# Patient Record
Sex: FEMALE | Race: WHITE | NOT HISPANIC OR LATINO
[De-identification: names, ages, dates, MRNs, and addresses within clinical notes are randomized per-mention and may not be internally consistent; named-entity substitution may affect disease eponyms.]

---

## 2020-10-06 ENCOUNTER — TRANSCRIPTION ENCOUNTER (OUTPATIENT)
Age: 30
End: 2020-10-06

## 2022-07-22 PROBLEM — Z00.00 ENCOUNTER FOR PREVENTIVE HEALTH EXAMINATION: Status: ACTIVE | Noted: 2022-07-22

## 2022-07-28 ENCOUNTER — APPOINTMENT (OUTPATIENT)
Dept: NEUROSURGERY | Facility: CLINIC | Age: 32
End: 2022-07-28

## 2022-07-28 VITALS
TEMPERATURE: 97 F | OXYGEN SATURATION: 98 % | BODY MASS INDEX: 27.48 KG/M2 | RESPIRATION RATE: 18 BRPM | HEART RATE: 80 BPM | SYSTOLIC BLOOD PRESSURE: 112 MMHG | DIASTOLIC BLOOD PRESSURE: 77 MMHG | HEIGHT: 66 IN | WEIGHT: 171 LBS

## 2022-07-28 DIAGNOSIS — M51.26 OTHER INTERVERTEBRAL DISC DISPLACEMENT, LUMBAR REGION: ICD-10-CM

## 2022-07-28 PROCEDURE — 99204 OFFICE O/P NEW MOD 45 MIN: CPT

## 2022-08-02 NOTE — HISTORY OF PRESENT ILLNESS
[> 3 months] : more  than 3 months [de-identified] : 31 yo right handed female has low back pain on the left for the past 7 years  had lumbar mri 2018 done and compared this past May 2022 developed increase left buttock pain radiating down left leg has  increased over the past few months. Seeing Dr. Garcia at [place] and underwent TEGAN X2 last 2 weeks ago with relief Pt is also underwent PT without relief .  Pt had MRI Lumbar spine and is here to discuss findings\par Pt is strong throught but while lifting Right leg she has increased l buttock and lle pain\par \par PMH/PSH denies\par \par Medications Neurontin ,Mexlocam\par \par NKDA

## 2022-08-02 NOTE — PHYSICAL EXAM
[General Appearance - Alert] : alert [General Appearance - In No Acute Distress] : in no acute distress [Oriented To Time, Place, And Person] : oriented to person, place, and time [Impaired Insight] : insight and judgment were intact [Affect] : the affect was normal [Person] : oriented to person [Place] : oriented to place [Time] : oriented to time [Short Term Intact] : short term memory intact [Remote Intact] : remote memory intact [Span Intact] : the attention span was normal [Concentration Intact] : normal concentrating ability [Fluency] : fluency intact [Comprehension] : comprehension intact [Current Events] : adequate knowledge of current events [Past History] : adequate knowledge of personal past history [Vocabulary] : adequate range of vocabulary [Cranial Nerves Optic (II)] : visual acuity intact bilaterally,  pupils equal round and reactive to light [Cranial Nerves Oculomotor (III)] : extraocular motion intact [Cranial Nerves Trigeminal (V)] : facial sensation intact symmetrically [Cranial Nerves Facial (VII)] : face symmetrical [Cranial Nerves Vestibulocochlear (VIII)] : hearing was intact bilaterally [Cranial Nerves Glossopharyngeal (IX)] : tongue and palate midline [Cranial Nerves Accessory (XI - Cranial And Spinal)] : head turning and shoulder shrug symmetric [Cranial Nerves Hypoglossal (XII)] : there was no tongue deviation with protrusion [Motor Strength] : muscle strength was normal in all four extremities [No Muscle Atrophy] : normal bulk in all four extremities [Sensation Tactile Decrease] : light touch was intact [Balance] : balance was intact [2+] : Patella left 2+ [No Visual Abnormalities] : no visible abnormailities [Intact] : all sensory within normal limits bilaterally [Sclera] : the sclera and conjunctiva were normal [PERRL With Normal Accommodation] : pupils were equal in size, round, reactive to light, with normal accommodation [Extraocular Movements] : extraocular movements were intact [Neck Appearance] : the appearance of the neck was normal [Neck Cervical Mass (___cm)] : no neck mass was observed [Jugular Venous Distention Increased] : there was no jugular-venous distention [Thyroid Diffuse Enlargement] : the thyroid was not enlarged [Thyroid Nodule] : there were no palpable thyroid nodules [Auscultation Breath Sounds / Voice Sounds] : lungs were clear to auscultation bilaterally [Heart Rate And Rhythm] : heart rate was normal and rhythm regular [Heart Sounds] : normal S1 and S2 [Heart Sounds Gallop] : no gallops [Murmurs] : no murmurs [Heart Sounds Pericardial Friction Rub] : no pericardial rub [No CVA Tenderness] : no ~M costovertebral angle tenderness [No Spinal Tenderness] : no spinal tenderness [Abnormal Walk] : normal gait [Nail Clubbing] : no clubbing  or cyanosis of the fingernails [Musculoskeletal - Swelling] : no joint swelling seen [Motor Tone] : muscle strength and tone were normal [Skin Color & Pigmentation] : normal skin color and pigmentation [Skin Turgor] : normal skin turgor [] : no rash [Past-pointing] : there was no past-pointing [Tremor] : no tremor present [FreeTextEntry9] : L knee hyperreflexic

## 2022-08-02 NOTE — DATA REVIEWED
[de-identified] : progression of lumbar degeneratove changes most pronounce L4-5 has a marked interval increase in sizeof a left central disc extrusion which now contributes to sever spinal canal stenosis, left lateral recess effacement and right lateral recess narrowing  Impingement of several nerve roots at this level  most severly affecting the left L 5 nerve root

## 2022-08-02 NOTE — ASSESSMENT
[FreeTextEntry1] : PLAN\par \par Reviewed images and assess pt\par Discussed Risks and benefits of performing L4-5 laminectomy\par Pt will call with a date for surgery \par Pt verbalized understanding\par \par \par \par I, Dr. Mccollum, personally performed the evaluation and management (E/M) services for this new patient. That E/M includes conducting the initial examination, assessing all conditions, and establishing the plan of care. Today, my ACP, Mary Ogden, was here to observe my evaluation and management services for this patient to be followed going forward.\par \par

## 2022-08-02 NOTE — ADDENDUM
[FreeTextEntry1] : PATIENT:	Lupe Castro 				\par \par \par Thursday, July 28, 2022\par \par I saw Lupe in the office today. She is a 32-year-old female with a significant past medical history who presents with a large, centrally herniated lumbar disc, and severe left leg pain. Lupe’s current history dates back to 2018 when she began to develop left leg symptoms, and she treated herself conservatively despite a sizeable, left sided L4-5 disc fragment. She underwent serial injections with significant improvement, however she has maintained significant left leg pain throughout. She presents now in the office with intractable left leg pain that is severe, after having acute exacerbation in March of this year.\par \par Lupe reports that her pain has been excruciating at times and had a series of epidural injections, again with some improvement. Today she presents with persistent pain, better than it was, but continues to affect her quality of life. \par \par Her past medical history is notable for the absence of significant surgical or medical histories. She takes meloxicam and gabapentin for pain, and has had no other major surgery. Neurologically she is intact. She is able to heel and toe walk without difficulty, and she has a straight leg test and a contralateral straight leg test as well. Imaging shows a large, L4-5 disc herniation that appears significantly larger than her prior, and in fact a disc looks to be new rather than recurrent.\par \par I had a long discussion with Lupe about the best way to proceed. We did discuss alternatives to open surgical resection of her disc, and I do think this would give her the best long-term outcome. We reviewed the potentials for a fusion as well, and I would like to recommend bilateral laminotomies in all likelihood with the intent to remove this fragment as well as possible. I certainly do not see a need for fusion at this time, and told her so. There is also a significant amount of modic changes that she is maintaining in her DIC, and it is possible that her back pain, should she develop it, could be improved with an Intracept procedure.\par \par \par \par \par \par \par \par \par Tito Mccollum MD\par \par [facility] #[id number]\par \par \par \par

## 2022-09-12 ENCOUNTER — OUTPATIENT (OUTPATIENT)
Dept: OUTPATIENT SERVICES | Facility: HOSPITAL | Age: 32
LOS: 1 days | End: 2022-09-12
Payer: COMMERCIAL

## 2022-09-12 ENCOUNTER — NON-APPOINTMENT (OUTPATIENT)
Age: 32
End: 2022-09-12

## 2022-09-12 ENCOUNTER — APPOINTMENT (OUTPATIENT)
Dept: NEUROSURGERY | Facility: CLINIC | Age: 32
End: 2022-09-12

## 2022-09-12 VITALS
OXYGEN SATURATION: 98 % | HEART RATE: 96 BPM | BODY MASS INDEX: 27.48 KG/M2 | HEIGHT: 66 IN | SYSTOLIC BLOOD PRESSURE: 126 MMHG | WEIGHT: 171 LBS | DIASTOLIC BLOOD PRESSURE: 78 MMHG | TEMPERATURE: 98.2 F

## 2022-09-12 DIAGNOSIS — M54.50 LOW BACK PAIN, UNSPECIFIED: ICD-10-CM

## 2022-09-12 DIAGNOSIS — Z01.818 ENCOUNTER FOR OTHER PREPROCEDURAL EXAMINATION: ICD-10-CM

## 2022-09-12 LAB
ANION GAP SERPL CALC-SCNC: 13 MMOL/L
BUN SERPL-MCNC: 6 MG/DL
CALCIUM SERPL-MCNC: 10.5 MG/DL
CHLORIDE SERPL-SCNC: 101 MMOL/L
CO2 SERPL-SCNC: 25 MMOL/L
CREAT SERPL-MCNC: 0.65 MG/DL
EGFR: 119 ML/MIN/1.73M2
GLUCOSE SERPL-MCNC: 98 MG/DL
POTASSIUM SERPL-SCNC: 4.3 MMOL/L
SODIUM SERPL-SCNC: 140 MMOL/L

## 2022-09-12 PROCEDURE — 93005 ELECTROCARDIOGRAM TRACING: CPT

## 2022-09-12 PROCEDURE — 93010 ELECTROCARDIOGRAM REPORT: CPT

## 2022-09-12 PROCEDURE — 99212 OFFICE O/P EST SF 10 MIN: CPT

## 2022-09-12 PROCEDURE — 71046 X-RAY EXAM CHEST 2 VIEWS: CPT

## 2022-09-12 PROCEDURE — 71046 X-RAY EXAM CHEST 2 VIEWS: CPT | Mod: 26

## 2022-09-13 NOTE — HISTORY OF PRESENT ILLNESS
[> 3 months] : more  than 3 months [de-identified] : 31 yo right handed female has low back pain on the left for the past 7 years  had lumbar mri 2018 done and compared this past May 2022 developed increase left buttock pain radiating down left leg has  increased over the past few months. Seeing Dr. Garcia at [facility] and underwent TEGAN X2 last 2 weeks ago with relief Pt is also underwent PT without relief .  Pt had MRI Lumbar spine and is here to discuss findings\par Pt is strong throught but while lifting Right leg she has increased l buttock and lle pain\par \par PMH/PSH denies\par \par Medications Neurontin ,Mexlocam\par \par NKDA

## 2022-09-13 NOTE — DATA REVIEWED
[de-identified] : progression of lumbar degenerative changes most pronounce L4-5 has a marked interval increase in sizeof a left central disc extrusion which now contributes to sever spinal canal stenosis, left lateral recess effacement and right lateral recess narrowing  Impingement of several nerve roots at this level  most severly affecting the left L 5 nerve root

## 2022-09-13 NOTE — ASSESSMENT
[FreeTextEntry1] : PREOP\par \par Pt is here today for preop clearance\par Examined pt\par Continue Neurontin 300mg bid\par Continue meloxacam 15 mg daily\par Ordered CXR preop\par Ordered preop CBC, Basic metabolic profile, Pt/PTT INR\par Ordered EKG for clearance\par Pt will be given instructions preop by our nurse Navigation\par Preop instructions reviewed with patient\par Questions answered\par

## 2022-09-14 ENCOUNTER — APPOINTMENT (OUTPATIENT)
Dept: NEUROSURGERY | Facility: HOSPITAL | Age: 32
End: 2022-09-14

## 2022-09-14 LAB
INR PPP: 1.07 RATIO
PT BLD: 12.4 SEC
SARS-COV-2 N GENE NPH QL NAA+PROBE: NOT DETECTED

## 2022-09-15 LAB
BASOPHILS # BLD AUTO: 0.04 K/UL
BASOPHILS NFR BLD AUTO: 0.5 %
EOSINOPHIL # BLD AUTO: 0.12 K/UL
EOSINOPHIL NFR BLD AUTO: 1.6 %
HCT VFR BLD CALC: 37.5 %
HGB BLD-MCNC: 12.5 G/DL
IMM GRANULOCYTES NFR BLD AUTO: 0.3 %
LYMPHOCYTES # BLD AUTO: 1.67 K/UL
LYMPHOCYTES NFR BLD AUTO: 22.8 %
MAN DIFF?: NORMAL
MCHC RBC-ENTMCNC: 31.3 PG
MCHC RBC-ENTMCNC: 33.3 GM/DL
MCV RBC AUTO: 93.8 FL
MONOCYTES # BLD AUTO: 0.49 K/UL
MONOCYTES NFR BLD AUTO: 6.7 %
NEUTROPHILS # BLD AUTO: 4.97 K/UL
NEUTROPHILS NFR BLD AUTO: 68.1 %
PLATELET # BLD AUTO: 325 K/UL
RBC # BLD: 4 M/UL
RBC # FLD: 12.2 %
WBC # FLD AUTO: 7.31 K/UL

## 2022-09-16 PROBLEM — M54.50 LUMBAR PAIN: Status: ACTIVE | Noted: 2022-07-27

## 2022-09-16 PROBLEM — Z01.818 PREOP TESTING: Status: ACTIVE | Noted: 2022-09-09

## 2022-09-23 LAB — APTT BLD: 29.3 SEC

## 2022-10-04 ENCOUNTER — APPOINTMENT (OUTPATIENT)
Dept: NEUROSURGERY | Facility: HOSPITAL | Age: 32
End: 2022-10-04